# Patient Record
(demographics unavailable — no encounter records)

---

## 2025-04-01 NOTE — HISTORY OF PRESENT ILLNESS
[Y] : Positive pregnancy history [TextBox_4] : GYNHX No history of fibroids, cysts, or STDs [Mammogramdate] : 2023 [Papeardate] : 6-2023 [ColonoscopyDate] : 2019 [TextBox_43] : david  2023 [PGxTotal] : 6 [[facility]] : 4 [PGHxABInduced] : 2 [FreeTextEntry1] : 2 , 2 C-sections with TOP

## 2025-04-01 NOTE — PROCEDURE
[Cervical Pap Smear] : cervical Pap smear [Liquid Base] : liquid base [Tolerated Well] : the patient tolerated the procedure well [No Complications] : there were no complications [Fibroid Uterus] : fibroid uterus [Transvaginal Ultrasound] : transvaginal ultrasound [FreeTextEntry4] : nl uterus , neg adnexa

## 2025-04-01 NOTE — DISCUSSION/SUMMARY
[FreeTextEntry1] : 64 yo [id number] annual gyn burning urinary, on HRT  pap hpv mammo DEXA  ur cx ud ip mod bld pelvic sono - nl  bactrim ds bid 3 days